# Patient Record
Sex: FEMALE | Race: WHITE
[De-identification: names, ages, dates, MRNs, and addresses within clinical notes are randomized per-mention and may not be internally consistent; named-entity substitution may affect disease eponyms.]

---

## 2019-02-27 NOTE — MRI
MRI LUMBAR SPINE WITH AND WITHOUT CONTRAST:

 

INDICATIONS:

Low back pain with right-sided sciatica.

 

COMPARISON:

No CT, MR, or radiographic comparisons are available.

 

CONTRAST:

MultiHance 15 mL.

 

TECHNIQUE:

Multiplanar, multisequence MR images were obtained of the lumbar spine with and without IV contrast.

 

FINDINGS:

Five lumbar type vertebral bodies are assumed for the purposes of this exam, due to lack of radiograp
hic comparison.

 

The conus is seen to terminate at approximately T12-L1.  There is grade 1 anterolisthesis of L4 on L5
.

 

Bone marrow signal intensity appears within normal limits.

 

L5-S1:  There is moderate to severe facet joint degenerative change bilaterally.  There is a broad-ba
sed bulge.  There is no appreciable central canal or neural foraminal narrowing.

 

L4-L5:  There is advanced facet joint degenerative change, grade 1 anterolisthesis, a broad-based bul
ge, and ligamentum flavum hypertrophy, inducing severe central canal narrowing and mild bilateral meghan
ral foraminal narrowing.

 

L3-L4:  There is a broad-based bulge with facet joint degenerative change but no appreciable central 
canal or neural foraminal narrowing.

 

L2-L3:  There is mild facet joint degenerative change and bibasilar bulge, but no appreciable central
 canal or neural foraminal narrowing.

 

L1-L2:  There is no appreciable central canal or neural foraminal narrowing.

 

T12-L1:  There is no appreciable central canal or neural foraminal narrowing.

 

No definite abnormal enhancement is seen.

 

IMPRESSION:

1.  Severe central canal narrowing at L4-L5 due to broad-based bulge, grade 1 anterolisthesis, and se
diony facet joint degenerative change.  There is mild bilateral neural foraminal narrowing at L4-L5.

 

2.  Multilevel spondylosis of the lumbar spine, as detailed above.

 

POS: [facility]

## 2019-04-01 ENCOUNTER — HOSPITAL ENCOUNTER (OUTPATIENT)
Dept: HOSPITAL 92 - SDC | Age: 79
Discharge: HOME | End: 2019-04-01
Attending: NEUROLOGICAL SURGERY
Payer: MEDICARE

## 2019-04-01 VITALS — BODY MASS INDEX: 27.4 KG/M2

## 2019-04-01 DIAGNOSIS — F03.90: ICD-10-CM

## 2019-04-01 DIAGNOSIS — Z79.899: ICD-10-CM

## 2019-04-01 DIAGNOSIS — M54.16: ICD-10-CM

## 2019-04-01 DIAGNOSIS — M43.16: Primary | ICD-10-CM

## 2019-04-01 DIAGNOSIS — E11.9: ICD-10-CM

## 2019-04-01 DIAGNOSIS — I10: ICD-10-CM

## 2019-04-01 DIAGNOSIS — M48.062: ICD-10-CM

## 2019-04-01 DIAGNOSIS — Z79.4: ICD-10-CM

## 2019-04-01 DIAGNOSIS — Z79.82: ICD-10-CM

## 2019-04-01 PROCEDURE — C1768 GRAFT, VASCULAR: HCPCS

## 2019-04-01 PROCEDURE — 76000 FLUOROSCOPY <1 HR PHYS/QHP: CPT

## 2019-04-01 PROCEDURE — 93010 ELECTROCARDIOGRAM REPORT: CPT

## 2019-04-01 PROCEDURE — 36416 COLLJ CAPILLARY BLOOD SPEC: CPT

## 2019-04-01 PROCEDURE — 93005 ELECTROCARDIOGRAM TRACING: CPT

## 2019-04-01 PROCEDURE — C1713 ANCHOR/SCREW BN/BN,TIS/BN: HCPCS

## 2019-04-01 PROCEDURE — 0SG0071 FUSION OF LUMBAR VERTEBRAL JOINT WITH AUTOLOGOUS TISSUE SUBSTITUTE, POSTERIOR APPROACH, POSTERIOR COLUMN, OPEN APPROACH: ICD-10-PCS | Performed by: NEUROLOGICAL SURGERY

## 2019-04-01 NOTE — OP
DATE OF PROCEDURE:  04/01/2019



FIRST ASSISTANT:  Lloyd Martinez PA-C



INDICATION:  Pain.



DIAGNOSIS:  Lumbar spondylolisthesis with lumbar stenosis with lumbar radiculopathy

and neurogenic claudication. 



PROCEDURES PERFORMED:  L4-L5 facetectomy and decompression, L4-L5 posterolateral

instrumented fusion, placement of allograft, and placement of autograft. 



ANESTHESIA:  General.



DESCRIPTION OF PROCEDURE:  The patient was brought into the operating room and

placed under general anesthesia.  She was flipped from the supine to prone position

on the operating room table.  A linear incision was planned over the L4-L5 segment.

After prepping and draping and after an appropriate preoperative pause, the incision

was created.  The soft tissues were swept away from midline.  A self-retaining

retractor was placed in the wound for optimal exposure.  After confirming the

appropriate level of C-arm fluoroscopy, high-speed cutting drill bit as well as 2,

3, and 4 mm Kerrisons were used to perform facetectomies bilaterally at the L4-L5

segment.  After decompressing the exiting and descending nerve roots as well as the

lateral recesses, pedicle screws were placed to the L4 and L5 pedicles bilaterally.

An intraoperative 3D CT scan was performed to confirm placement of hardware.

Allograft and autograft material were then placed within the lateral confines of the

instrumentation construct.  The wound was irrigated.  Hemostasis was maintained

throughout.  The wound was then closed in anatomic layers and a pressure dressing

was applied.  There were no known procedural complications. 







Job ID:  [id number]

## 2019-04-02 NOTE — EKG
Test Reason : PREOP

Blood Pressure : ***/*** mmHG

Vent. Rate : 091 BPM     Atrial Rate : 091 BPM

   P-R Int : 200 ms          QRS Dur : 082 ms

    QT Int : 368 ms       P-R-T Axes : 061 029 052 degrees

   QTc Int : 452 ms

 

Normal sinus rhythm

Normal ECG

No previous ECGs available

Confirmed by DR. BATSHEVA GARCIA MD ([id number]) on 4/2/2019 9:01:16 PM

 

Referred By:  WILLAM           Confirmed By:DR. BATSHEVA GARCIA MD